# Patient Record
Sex: FEMALE | Race: WHITE | NOT HISPANIC OR LATINO | Employment: OTHER
[De-identification: names, ages, dates, MRNs, and addresses within clinical notes are randomized per-mention and may not be internally consistent; named-entity substitution may affect disease eponyms.]

---

## 2017-07-29 ENCOUNTER — HEALTH MAINTENANCE LETTER (OUTPATIENT)
Age: 52
End: 2017-07-29

## 2018-08-15 ENCOUNTER — AMBULATORY - HEALTHEAST (OUTPATIENT)
Dept: SLEEP MEDICINE | Facility: CLINIC | Age: 53
End: 2018-08-15

## 2018-08-15 DIAGNOSIS — G47.9 SLEEPING DIFFICULTY: ICD-10-CM

## 2019-03-17 ENCOUNTER — OFFICE VISIT - HEALTHEAST (OUTPATIENT)
Dept: FAMILY MEDICINE | Facility: CLINIC | Age: 54
End: 2019-03-17

## 2019-03-17 DIAGNOSIS — J40 BRONCHITIS: ICD-10-CM

## 2021-02-06 ENCOUNTER — OFFICE VISIT - HEALTHEAST (OUTPATIENT)
Dept: FAMILY MEDICINE | Facility: CLINIC | Age: 56
End: 2021-02-06

## 2021-02-06 DIAGNOSIS — L03.317 CELLULITIS OF BUTTOCK: ICD-10-CM

## 2021-02-06 DIAGNOSIS — L30.9 DERMATITIS: ICD-10-CM

## 2021-02-06 RX ORDER — LEVOTHYROXINE SODIUM 25 UG/1
25 TABLET ORAL DAILY
Status: SHIPPED | COMMUNITY
Start: 2020-12-13

## 2021-02-06 RX ORDER — VENLAFAXINE HYDROCHLORIDE 75 MG/1
CAPSULE, EXTENDED RELEASE ORAL
Status: SHIPPED | COMMUNITY
Start: 2020-12-14

## 2021-02-06 RX ORDER — TRIAMCINOLONE ACETONIDE 1 MG/G
OINTMENT TOPICAL
Qty: 30 G | Refills: 0 | Status: SHIPPED | OUTPATIENT
Start: 2021-02-06

## 2021-05-27 VITALS
DIASTOLIC BLOOD PRESSURE: 81 MMHG | HEART RATE: 80 BPM | TEMPERATURE: 98 F | SYSTOLIC BLOOD PRESSURE: 128 MMHG | OXYGEN SATURATION: 98 % | RESPIRATION RATE: 16 BRPM

## 2021-05-31 ENCOUNTER — RECORDS - HEALTHEAST (OUTPATIENT)
Dept: ADMINISTRATIVE | Facility: CLINIC | Age: 56
End: 2021-05-31

## 2021-06-02 VITALS — WEIGHT: 177.7 LBS

## 2021-06-25 NOTE — PATIENT INSTRUCTIONS - HE
There were no signs of pneumonia on exam.    Your symptoms are most likely due to acute bronchitis.  This is inflammation of the tubes leading into the lungs, most often due to a viral infection and an antibiotic will not help this.    I have prescribed an albuterol inhaler to help with the cough/wheezing.    May take Tessalon Perles as needed for cough.  May also try to use Mucinex (Guaifenasin) to help thin mucous secretions.    Please monitor symptoms carefully.  If developing chest pain, shortness of breath, fever, coughing up blood, extreme fatigue, or any other new, concerning symptoms, come back to clinic or go to ER immediately.  Otherwise, if no improvement in symptoms in 5 days, follow-up with your primary care provider.

## 2021-06-25 NOTE — PROGRESS NOTES
Chief Complaint   Patient presents with     Cough     eyes burning, coughing green mucus     Neck Pain       HPI:  Zuleyma Cedeño is a 53 y.o. female who presents today complaining of cough x 9 days.  3 days the patient felt very sick and tired.  She started to feel better but the cough has persisted.  Her cough is productive with green mucus and she feels congested in her chest.  She denies any fevers, nasal complaints, or sore throat.  She has not any abdominal complaint.  She denies any history of lung disease such as asthma or COPD.  She has been taking TheraFlu and drinking tea and honey to help with her cough.  She denies any chest pain or dyspnea upon exertion.    History obtained from the patient.    Problem List:  There are no relevant problems documented for this patient.      No past medical history on file.    Social History     Tobacco Use     Smoking status: Never Smoker     Smokeless tobacco: Never Used   Substance Use Topics     Alcohol use: Not on file       Review of Systems   Constitutional: Negative for fever.   HENT: Negative for congestion, ear pain, rhinorrhea and sore throat.    Respiratory: Positive for cough and wheezing. Negative for shortness of breath.    Cardiovascular: Negative for chest pain.   Gastrointestinal: Negative.        Vitals:    03/17/19 1148   BP: 136/79   Patient Site: Right Arm   Patient Position: Sitting   Cuff Size: Adult Regular   Pulse: 75   Resp: 17   Temp: 97.2  F (36.2  C)   TempSrc: Oral   SpO2: 100%   Weight: 177 lb 11.2 oz (80.6 kg)       Physical Exam   Constitutional: She appears well-developed and well-nourished. No distress.   HENT:   Head: Normocephalic and atraumatic.   Right Ear: External ear normal.   Left Ear: External ear normal.   Eyes: Conjunctivae are normal. Right eye exhibits no discharge. Left eye exhibits no discharge.   Cardiovascular: Normal rate, regular rhythm and normal heart sounds.   Pulmonary/Chest: Effort normal. No stridor. No  respiratory distress. She has no wheezes.   Course lung sounds and dry hacking cough.   Skin: She is not diaphoretic.   Psychiatric: She has a normal mood and affect. Her behavior is normal. Judgment and thought content normal.     Clinical Decision Making:  Lungs are clear to auscultation.  No wheezes noted currently, but patient reports them.  No signs of pneumonia and have a low suspicion for complication of a viral illness since patient has been afebrile.  Suspect viral bronchitis.  Recommend supportive cares.  Patient was started on prednisone, albuterol, Tessalon Perles.  Also recommended Mucinex to help thin mucus secretions.  Patient is agreeable to plan.  She is stable for discharge.  At the end of the encounter, I discussed results, diagnosis, medications. Discussed red flags for immediate return to clinic/ER, as well as indications for follow up if no improvement. Patient understood and agreed to plan. Patient was stable for discharge.    1. Bronchitis  predniSONE (DELTASONE) 20 MG tablet    benzonatate (TESSALON) 200 MG capsule    albuterol (PROAIR HFA;PROVENTIL HFA;VENTOLIN HFA) 90 mcg/actuation inhaler         Patient Instructions   There were no signs of pneumonia on exam.    Your symptoms are most likely due to acute bronchitis.  This is inflammation of the tubes leading into the lungs, most often due to a viral infection and an antibiotic will not help this.    I have prescribed an albuterol inhaler to help with the cough/wheezing.    May take Tessalon Perles as needed for cough.  May also try to use Mucinex (Guaifenasin) to help thin mucous secretions.    Please monitor symptoms carefully.  If developing chest pain, shortness of breath, fever, coughing up blood, extreme fatigue, or any other new, concerning symptoms, come back to clinic or go to ER immediately.  Otherwise, if no improvement in symptoms in 5 days, follow-up with your primary care provider.

## 2021-06-30 NOTE — PROGRESS NOTES
Progress Notes by Mariana Salazar MD at 2/6/2021  2:50 PM     Author: Mariana Salazar MD Service: -- Author Type: Physician    Filed: 2/6/2021  3:15 PM Encounter Date: 2/6/2021 Status: Signed    : Mariana Salazar MD (Physician)         Assessment:      Contact dermatitis with possible superimposed cellulitis.      Plan:      Aveeno baths  Benadryl prn for itching.  Follow up prn  Observe for signs of superimposed infection and systemic symptoms.  1. Cellulitis of buttock  cephalexin (KEFLEX) 500 MG capsule    triamcinolone (KENALOG) 0.1 % ointment   2. Dermatitis           Subjective:       Zuleyma Cedeño is a 55 y.o. female who presents for evaluation of rash. Rash started 3 days ago. Initial distribution: right buttock. Lesions are trey-colored and pink in color, are of raised texture, 3 by 5 cm size. Rash has not changed over time.  Rash is pruritic. Associated symptoms: none. Patient denies: fever and pain, no drainge. Patient has had previous evaluation of rash. Patient has had previous treatment.  Response to treatment: excellent. Patient has not had contacts with similar rash. Patient has not had new exposures.  The following portions of the patient's history were reviewed and updated as appropriate: allergies, current medications, past family history, past medical history, past social history, past surgical history and problem list.    Review of Systems  Pertinent items are noted in HPI.      Objective:     Physical Exam  Constitutional:       Appearance: Normal appearance.   HENT:      Head: Normocephalic and atraumatic.   Skin:            Comments: Red patchy well-circumscribed lesion on right buttocks. Some woody edema. No induration.    Neurological:      Mental Status: She is alert.

## 2022-06-14 ENCOUNTER — TRANSFERRED RECORDS (OUTPATIENT)
Dept: HEALTH INFORMATION MANAGEMENT | Facility: CLINIC | Age: 57
End: 2022-06-14

## 2022-06-14 LAB
ALT SERPL-CCNC: 17 U/L (ref 6–29)
AST SERPL-CCNC: 17 U/L (ref 10–35)
CREATININE (EXTERNAL): 0.72 MG/DL (ref 0.5–1.05)
GFR ESTIMATED (EXTERNAL): 94 ML/MIN/1.73M2
GFR ESTIMATED (IF AFRICAN AMERICAN) (EXTERNAL): 108 ML/MIN/1.73M2
GLUCOSE (EXTERNAL): 101 MG/DL (ref 65–99)
POTASSIUM (EXTERNAL): 4.3 MMOL/L (ref 3.5–5.3)
TSH SERPL-ACNC: 1.33 MIU/L (ref 0.4–4.5)

## 2022-10-04 ENCOUNTER — HOSPITAL ENCOUNTER (EMERGENCY)
Facility: HOSPITAL | Age: 57
Discharge: HOME | End: 2022-10-04
Attending: EMERGENCY MEDICINE | Admitting: EMERGENCY MEDICINE
Payer: COMMERCIAL

## 2022-10-04 VITALS
RESPIRATION RATE: 8 BRPM | SYSTOLIC BLOOD PRESSURE: 158 MMHG | OXYGEN SATURATION: 99 % | TEMPERATURE: 98.1 F | HEART RATE: 71 BPM | DIASTOLIC BLOOD PRESSURE: 92 MMHG

## 2022-10-04 DIAGNOSIS — I47.10 PAROXYSMAL SUPRAVENTRICULAR TACHYCARDIA (H): ICD-10-CM

## 2022-10-04 LAB
ANION GAP SERPL CALCULATED.3IONS-SCNC: 10 MMOL/L (ref 7–15)
BASOPHILS # BLD AUTO: 0.1 10E3/UL (ref 0–0.2)
BASOPHILS NFR BLD AUTO: 1 %
BUN SERPL-MCNC: 12.4 MG/DL (ref 6–20)
CALCIUM SERPL-MCNC: 9.9 MG/DL (ref 8.6–10)
CHLORIDE SERPL-SCNC: 102 MMOL/L (ref 98–107)
CREAT SERPL-MCNC: 0.7 MG/DL (ref 0.51–0.95)
DEPRECATED HCO3 PLAS-SCNC: 27 MMOL/L (ref 22–29)
EOSINOPHIL # BLD AUTO: 0.1 10E3/UL (ref 0–0.7)
EOSINOPHIL NFR BLD AUTO: 2 %
ERYTHROCYTE [DISTWIDTH] IN BLOOD BY AUTOMATED COUNT: 12.8 % (ref 10–15)
GFR SERPL CREATININE-BSD FRML MDRD: >90 ML/MIN/1.73M2
GLUCOSE SERPL-MCNC: 97 MG/DL (ref 70–99)
HCT VFR BLD AUTO: 44.2 % (ref 35–47)
HGB BLD-MCNC: 14.6 G/DL (ref 11.7–15.7)
IMM GRANULOCYTES # BLD: 0 10E3/UL
IMM GRANULOCYTES NFR BLD: 0 %
LYMPHOCYTES # BLD AUTO: 1.9 10E3/UL (ref 0.8–5.3)
LYMPHOCYTES NFR BLD AUTO: 28 %
MAGNESIUM SERPL-MCNC: 2.2 MG/DL (ref 1.7–2.3)
MCH RBC QN AUTO: 29.6 PG (ref 26.5–33)
MCHC RBC AUTO-ENTMCNC: 33 G/DL (ref 31.5–36.5)
MCV RBC AUTO: 90 FL (ref 78–100)
MONOCYTES # BLD AUTO: 0.3 10E3/UL (ref 0–1.3)
MONOCYTES NFR BLD AUTO: 4 %
NEUTROPHILS # BLD AUTO: 4.6 10E3/UL (ref 1.6–8.3)
NEUTROPHILS NFR BLD AUTO: 65 %
NRBC # BLD AUTO: 0 10E3/UL
NRBC BLD AUTO-RTO: 0 /100
PLATELET # BLD AUTO: 327 10E3/UL (ref 150–450)
POTASSIUM SERPL-SCNC: 3.9 MMOL/L (ref 3.4–5.3)
RBC # BLD AUTO: 4.94 10E6/UL (ref 3.8–5.2)
SODIUM SERPL-SCNC: 139 MMOL/L (ref 136–145)
TSH SERPL DL<=0.005 MIU/L-ACNC: 1.13 UIU/ML (ref 0.3–4.2)
WBC # BLD AUTO: 7 10E3/UL (ref 4–11)

## 2022-10-04 PROCEDURE — 80048 BASIC METABOLIC PNL TOTAL CA: CPT | Performed by: EMERGENCY MEDICINE

## 2022-10-04 PROCEDURE — 93005 ELECTROCARDIOGRAM TRACING: CPT | Performed by: EMERGENCY MEDICINE

## 2022-10-04 PROCEDURE — 85018 HEMOGLOBIN: CPT | Performed by: EMERGENCY MEDICINE

## 2022-10-04 PROCEDURE — 99284 EMERGENCY DEPT VISIT MOD MDM: CPT

## 2022-10-04 PROCEDURE — 83735 ASSAY OF MAGNESIUM: CPT | Performed by: EMERGENCY MEDICINE

## 2022-10-04 PROCEDURE — 84443 ASSAY THYROID STIM HORMONE: CPT | Performed by: EMERGENCY MEDICINE

## 2022-10-04 PROCEDURE — 36415 COLL VENOUS BLD VENIPUNCTURE: CPT | Performed by: EMERGENCY MEDICINE

## 2022-10-04 ASSESSMENT — ENCOUNTER SYMPTOMS
ABDOMINAL PAIN: 0
LIGHT-HEADEDNESS: 1
DYSURIA: 0
DIZZINESS: 0
NAUSEA: 0
FEVER: 0
DIAPHORESIS: 1
CONFUSION: 0
DIARRHEA: 0
FATIGUE: 1
SORE THROAT: 0
HEMATURIA: 0
SHORTNESS OF BREATH: 0
JOINT SWELLING: 0
CHILLS: 0
VOMITING: 0

## 2022-10-04 NOTE — DISCHARGE INSTRUCTIONS
Follow up with cardiology for further testing and management.  Contact your primary clinic as well to discuss all of this. If symptoms return, try previous vagal maneuvers you were taught. Return if not resolving or you develop worsening symptoms/concerns.

## 2022-10-04 NOTE — ED PROVIDER NOTES
Emergency Department Encounter     Evaluation Date & Time:   10/4/2022 11:13 AM    CHIEF COMPLAINT:  Chest Pain      Triage Note:  Patient brought in by EMS. Pt was at home cleaning her floors when she started to feel SOB and diaphoretic. Pt called 911 and EMS arrived. Gave pt adenosine which converted pt to a NSR.                   ED COURSE & MEDICAL DECISION MAKING:     ED Course as of 10/04/22 1353   Tue Oct 04, 2022   1207 Labs all unremarkable.     1239 Pt remains well on re-evaluation.  Cardiology EP referral placed.  Pt understands follow up, return precautions.       Pt presenting by EMS for evaluation of symptomatic tachycardia, found to have HR around 200 per EMS, received adenosine 12mg IV by EMS and converted to NSR in the 80s with resolution of symptoms. Pt reports this episode started around 0900 while cleaning floors. Does admit to poor diet at a weekend concert. Also reports she's had intermittent similar episodes for quite some time, previously saw cardiology and instructed on vagal maneuvers, which she tried. Denies any specific diagnosis that she's aware of, but pre-hospital EKG shows SVT.  Discussed with her and  what this is, treatment and plan for workup here. She's a little fatigued, but otherwise well here now.  No recent illness. Nothing to suggest PE/DVT. Will get labs, plan for discharge and outpatient cardiology referral as she may need EP workup/ablation as these episodes are occurring more frequently for her, but have previously resolved with vagal maneuvers, which did not work this time.     11:17 AM I met with the patient, obtained history, performed an initial exam, and discussed options and plan for diagnostics and treatment here in the ED.    12:36 PM I rechecked the patient and updated them on labs and EKG.    12:39 We discussed the plan for discharge and the patient is agreeable. Reviewed supportive cares, symptomatic treatment, outpatient follow up, and reasons to  "return to the Emergency Department. Patient to be discharged by ED RN.     At the conclusion of the encounter I discussed the results of all the tests and the disposition. The questions were answered. The patient or family acknowledged understanding and was agreeable with the care plan.      MEDICATIONS GIVEN IN THE EMERGENCY DEPARTMENT:  Medications - No data to display    NEW PRESCRIPTIONS STARTED AT TODAY'S ED VISIT:  Discharge Medication List as of 10/4/2022 12:41 PM          HPI   The history is provided by the patient. No  was used.        Zuleyma Cedeño is a 56 year old female with a pertinent history of hypothyroidism and tachycardia who presents to this ED for evaluation of chest pain.      Patient reports that this morning (10/4) around 9:45  while cleaning she started to feel like her heart was \"jumping around\" for a few minutes, and she also started to feel fatigued, lightheaded and diaphoretic.   Earlier this morning around 4:00 AM she had also felt like she couldn't breath well, but notes she had went away to a concert for the weekend and had not eaten healthy.  Pt called EMS after attempts to stop palpitatioins did not work.  EMS found her to be in SVT and gave her 12mg IV adenosine with conversion to NSR just pta to ED.  Pt now feels much better, just fatigued.    She has had the heart jumping sensation in the past, and she she would only get them twice a year. Today she had also felt anxious and wonders if it could be related to that. She has been seen by a cardiologist and they suggested surgery. She had a previous episode yesterday and she was able to get it under control through means that have worked before (vagal maneuvers). Today she wasn't able to get her heart rate under control through her known methods.     She denies any chest pain, discomfort, nausea, vomiting, or syncope.She reports a family history of ablation (her cousin), and her father had passed away from " heart related problems.   No other complaints at this time.    REVIEW OF SYSTEMS:  Review of Systems   Constitutional: Positive for diaphoresis and fatigue. Negative for chills and fever.   HENT: Negative for sore throat.    Eyes: Negative for visual disturbance.   Respiratory: Negative for shortness of breath.    Cardiovascular: Negative for chest pain (or discomfort).   Gastrointestinal: Negative for abdominal pain, diarrhea, nausea and vomiting.   Endocrine: Negative for polyuria.   Genitourinary: Negative for dysuria and hematuria.        - urinary changes     Musculoskeletal: Negative for joint swelling.   Skin: Negative for rash.   Neurological: Positive for light-headedness. Negative for dizziness and syncope.   Psychiatric/Behavioral: Negative for confusion.   All other systems reviewed and are negative.          Medical History     Past Medical History:   Diagnosis Date     Nonspecific abnormal results of thyroid function study      Papanicolaou smear of cervix with atypical squamous cells of undetermined significance (ASC-US) 05/03       No past surgical history on file.    No family history on file.    Social History     Tobacco Use     Smoking status: Never Smoker     Smokeless tobacco: Never Used       levothyroxine (SYNTHROID, LEVOTHROID) 25 MCG tablet  triamcinolone (KENALOG) 0.1 % ointment  venlafaxine (EFFEXOR-XR) 75 MG 24 hr capsule        Physical Exam     Vitals:  BP (!) 158/92   Pulse 71   Temp 98.1  F (36.7  C) (Oral)   Resp 8   SpO2 99%     PHYSICAL EXAM: **  Physical Exam  Vitals and nursing note reviewed.   Constitutional:       General: She is not in acute distress.     Appearance: Normal appearance.   HENT:      Head: Normocephalic and atraumatic.      Nose: Nose normal.      Mouth/Throat:      Mouth: Mucous membranes are moist.   Eyes:      Pupils: Pupils are equal, round, and reactive to light.   Cardiovascular:      Rate and Rhythm: Normal rate and regular rhythm.      Pulses:  Normal pulses.           Radial pulses are 2+ on the right side and 2+ on the left side.        Dorsalis pedis pulses are 2+ on the right side and 2+ on the left side.   Pulmonary:      Effort: Pulmonary effort is normal. No respiratory distress.      Breath sounds: Normal breath sounds.   Abdominal:      Palpations: Abdomen is soft.      Tenderness: There is no abdominal tenderness.   Musculoskeletal:      Cervical back: Full passive range of motion without pain and neck supple.      Comments: No calf tenderness or swelling b/l   Skin:     General: Skin is warm.      Findings: No rash.   Neurological:      General: No focal deficit present.      Mental Status: She is alert. Mental status is at baseline.      Comments: Fluent speech, no acute lateralizing deficits   Psychiatric:         Mood and Affect: Mood normal.         Behavior: Behavior normal.         Results     LAB:  All pertinent labs reviewed and interpreted  Labs Ordered and Resulted from Time of ED Arrival to Time of ED Departure   BASIC METABOLIC PANEL - Normal       Result Value    Sodium 139      Potassium 3.9      Chloride 102      Carbon Dioxide (CO2) 27      Anion Gap 10      Urea Nitrogen 12.4      Creatinine 0.70      Calcium 9.9      Glucose 97      GFR Estimate >90     MAGNESIUM - Normal    Magnesium 2.2     TSH WITH FREE T4 REFLEX - Normal    TSH 1.13     CBC WITH PLATELETS AND DIFFERENTIAL    WBC Count 7.0      RBC Count 4.94      Hemoglobin 14.6      Hematocrit 44.2      MCV 90      MCH 29.6      MCHC 33.0      RDW 12.8      Platelet Count 327      % Neutrophils 65      % Lymphocytes 28      % Monocytes 4      % Eosinophils 2      % Basophils 1      % Immature Granulocytes 0      NRBCs per 100 WBC 0      Absolute Neutrophils 4.6      Absolute Lymphocytes 1.9      Absolute Monocytes 0.3      Absolute Eosinophils 0.1      Absolute Basophils 0.1      Absolute Immature Granulocytes 0.0      Absolute NRBCs 0.0         RADIOLOGY:  No orders to  display                ECG:  NSR, rate 83, normal intervals, no acute ischemia    I have independently reviewed and interpreted the EKG(s) documented above     PROCEDURES:  Procedures:  None.      FINAL IMPRESSION:    ICD-10-CM    1. Paroxysmal supraventricular tachycardia (H)  I47.1 Adult Cardiology Eval  Referral       0 minutes of critical care time      I, Paolo Mckay , am serving as a scribe to document services personally performed by Dr. Gio Freitas, based on my observations and the provider's statements to me. I, Gio Freitas, DO attest that Paolo Mckay  is acting in a scribe capacity, has observed my performance of the services and has documented them in accordance with my direction.      Gio Freitas DO  Emergency Medicine  [facility] EMERGENCY DEPARTMENT  10/4/2022  11:26 AM        Gio Freitas MD  10/04/22 9067

## 2022-10-04 NOTE — ED TRIAGE NOTES
Patient brought in by EMS. Pt was at home cleaning her floors when she started to feel SOB and diaphoretic. Pt called 911 and EMS arrived. Gave pt adenosine which converted pt to a NSR.

## 2022-10-04 NOTE — ED TRIAGE NOTES
Triage Assessment     Row Name 10/04/22 1127       Triage Assessment (Adult)    Airway WDL WDL    Additional Documentation Satya Coma Scale (Group);Heart Sounds (Row)       Respiratory WDL    Respiratory WDL WDL       Skin Circulation/Temperature WDL    Skin Circulation/Temperature WDL WDL       Cardiac WDL    Cardiac WDL X;rhythm    Cardiac Rhythm other (see comments)  SVT prearrival        Peripheral/Neurovascular WDL    Peripheral Neurovascular WDL WDL       Cognitive/Neuro/Behavioral WDL    Cognitive/Neuro/Behavioral WDL WDL

## 2022-10-05 LAB
ATRIAL RATE - MUSE: 83 BPM
DIASTOLIC BLOOD PRESSURE - MUSE: 94 MMHG
INTERPRETATION ECG - MUSE: NORMAL
P AXIS - MUSE: 65 DEGREES
PR INTERVAL - MUSE: 162 MS
QRS DURATION - MUSE: 96 MS
QT - MUSE: 382 MS
QTC - MUSE: 448 MS
R AXIS - MUSE: 55 DEGREES
SYSTOLIC BLOOD PRESSURE - MUSE: 154 MMHG
T AXIS - MUSE: 55 DEGREES
VENTRICULAR RATE- MUSE: 83 BPM

## 2022-11-29 ENCOUNTER — OFFICE VISIT (OUTPATIENT)
Dept: CARDIOLOGY | Facility: CLINIC | Age: 57
End: 2022-11-29
Attending: EMERGENCY MEDICINE
Payer: COMMERCIAL

## 2022-11-29 VITALS
BODY MASS INDEX: 31.22 KG/M2 | HEART RATE: 67 BPM | DIASTOLIC BLOOD PRESSURE: 78 MMHG | HEIGHT: 63 IN | WEIGHT: 176.2 LBS | OXYGEN SATURATION: 93 % | SYSTOLIC BLOOD PRESSURE: 130 MMHG | RESPIRATION RATE: 16 BRPM

## 2022-11-29 DIAGNOSIS — I47.10 PAROXYSMAL SUPRAVENTRICULAR TACHYCARDIA (H): Primary | ICD-10-CM

## 2022-11-29 DIAGNOSIS — E03.9 HYPOTHYROIDISM, UNSPECIFIED TYPE: ICD-10-CM

## 2022-11-29 DIAGNOSIS — F41.9 ANXIETY: ICD-10-CM

## 2022-11-29 PROCEDURE — 99204 OFFICE O/P NEW MOD 45 MIN: CPT | Performed by: INTERNAL MEDICINE

## 2022-11-29 RX ORDER — CITALOPRAM HYDROBROMIDE 20 MG/1
TABLET ORAL
COMMUNITY
Start: 2022-11-28

## 2022-11-29 RX ORDER — METOPROLOL TARTRATE 25 MG/1
25 TABLET, FILM COATED ORAL PRN
Qty: 10 TABLET | Refills: 1 | Status: SHIPPED | OUTPATIENT
Start: 2022-11-29 | End: 2024-08-13

## 2022-11-29 NOTE — PATIENT INSTRUCTIONS
Continue vagal manuevers for SVT. If that does not work, take metoprolol 25 mg to see if this would break the rhythm.  Will have you meet with one of my EP or rhythm colleagues regarding an ablation therapy.

## 2022-11-29 NOTE — PROGRESS NOTES
Thank you, Dr. Gio Freitas, for asking the [facility] team to see Ms. Zuleyma Cedeño to follow-up on SVT.    Assessment/Recommendations   Assessment:    1.  PSVT, resolved with adenosine administered by EMS in route to the ED.  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  She has been able to break these episodes in the past with vagal maneuver but did not have success on the day she presented to the ED.  At this point, discussed pill in the pocket method of treatment versus ablation therapy.  Have recommended setting her up to meet with one of the electrophysiologist to talk about ablation.  In the meantime, we will prescribe metoprolol tartrate 25 mg to take as needed for the tachycardia if a vagal maneuver does not break it.  2.  Hypothyroidism, treated with Synthroid.  TSH level within normal limits.  3.  Mild anxiety disorder      Plan:  1.  Continue current medications  2.  Patient given prescription for metoprolol tartrate 25 mg to take as needed for tachycardia  3.  Referral to EP regarding SVT ablation     History of Present Illness    Ms. Zuleyma Cedeño is a 56 year old female with history of paroxysmal tachycardia, previously controlled with vagal maneuvers who presented to the ED in September following an episode of tachycardia which did not resolve at home.  Paramedics were subsequently called and they found her to be in a narrow complex tachycardia at a rate of 200.  She was given 12 mg of IV adenosine in route to the ED with spontaneous conversion to sinus rhythm.  No recurrence of the arrhythmia was identified in the ED and the remainder of her work-up was unremarkable.  Now referred here for further evaluation.  Patient has had a history of paroxysmal tachycardia dating back 20 years now.  Had been seen by Dr. Toby Mendez in 2014 who again recommended  "vagal maneuvers but also suggested referral to EP.  At that time her episodes were infrequent and she elected to continue with vagal maneuver therapy.  Recently she reports a slight increase in frequency of her tachycardia episodes.  No clear precipitating factors.    ECG (personally reviewed): ECG in the ED demonstrates sinus rhythm    Cardiac Imaging Studies (personally reviewed): No prior cardiac imaging     Physical Examination Review of Systems   /78 (BP Location: Left arm, Patient Position: Sitting, Cuff Size: Adult Regular)   Pulse 67   Resp 16   Ht 1.6 m (5' 3\")   Wt 79.9 kg (176 lb 3.2 oz)   SpO2 93%   BMI 31.21 kg/m    Body mass index is 31.21 kg/m .  Wt Readings from Last 3 Encounters:   11/29/22 79.9 kg (176 lb 3.2 oz)   03/17/19 80.6 kg (177 lb 11.2 oz)     General Appearance:   Awake, Alert, No acute distress.   HEENT:  No scleral icterus; the mucous membranes were pink and moist.   Neck: No cervical bruits or jugular venous distention    Chest: The spine was straight. The chest was symmetric.   Lungs:   Respirations unlabored; the lungs are clear to auscultation. No wheezing   Cardiovascular:    Regular rate and rhythm.  S1, S2 normal.  No murmur or gallop   Abdomen:  No organomegaly, masses, bruits, or tenderness. Bowels sounds are present   Extremities:  No peripheral edema bilaterally   Skin: No xanthelasma. Warm, Dry.   Musculoskeletal: No tenderness.   Neurologic: Mood and affect are appropriate.    Enc Vitals  BP: 130/78  Pulse: 67  Resp: 16  SpO2: 93 %  Weight: 79.9 kg (176 lb 3.2 oz)  Height: 160 cm (5' 3\")                                         Medical History  Surgical History Family History Social History   Past Medical History:   Diagnosis Date     Nonspecific abnormal results of thyroid function study      Papanicolaou smear of cervix with atypical squamous cells of undetermined significance (ASC-US) 05/03    No past surgical history on file. No family history on file. Social " History     Socioeconomic History     Marital status:      Spouse name: Not on file     Number of children: Not on file     Years of education: Not on file     Highest education level: Not on file   Occupational History     Not on file   Tobacco Use     Smoking status: Former     Types: Cigarettes     Quit date:      Years since quittin.9     Smokeless tobacco: Never   Substance and Sexual Activity     Alcohol use: Yes     Comment: socially (vodka with soda)     Drug use: Never     Sexual activity: Not on file   Other Topics Concern     Not on file   Social History Narrative     Not on file     Social Determinants of Health     Financial Resource Strain: Not on file   Food Insecurity: Not on file   Transportation Needs: Not on file   Physical Activity: Not on file   Stress: Not on file   Social Connections: Not on file   Intimate Partner Violence: Not on file   Housing Stability: Not on file          Medications  Allergies   Current Outpatient Medications   Medication Sig Dispense Refill     citalopram (CELEXA) 20 MG tablet        levothyroxine (SYNTHROID, LEVOTHROID) 25 MCG tablet [LEVOTHYROXINE (SYNTHROID, LEVOTHROID) 25 MCG TABLET] Take 25 mcg by mouth daily.       metoprolol tartrate (LOPRESSOR) 25 MG tablet Take 1 tablet (25 mg) by mouth as needed (tachycardia) 10 tablet 1     omeprazole (PRILOSEC) 20 MG DR capsule Take 20 mg by mouth 2 times daily       triamcinolone (KENALOG) 0.1 % ointment [TRIAMCINOLONE (KENALOG) 0.1 % OINTMENT] Apply to affected region twice daily. 30 g 0     venlafaxine (EFFEXOR-XR) 75 MG 24 hr capsule [VENLAFAXINE (EFFEXOR-XR) 75 MG 24 HR CAPSULE]         No Known Allergies      Lab Results    Chemistry/lipid CBC Cardiac Enzymes/BNP/TSH/INR   Recent Labs   Lab Test 10/04/22  1123   BUN 12.4      CO2 27    Recent Labs   Lab Test 10/04/22  1123   WBC 7.0   HGB 14.6   HCT 44.2   MCV 90       Recent Labs   Lab Test 10/04/22  1123   TSH 1.13        A total of 55  minutes was spent reviewing patient's medical records, obtaining history and performing examination, as well as discussing diagnoses/ recommendations with patient and answering all questions.                       [General Appearance - Well Developed] : interactive [General Appearance - Well-Appearing] : well appearing [General Appearance - In No Acute Distress] : in no acute distress [FreeTextEntry1] : at normal baseline [Appearance Of Head] : the head was normocephalic [Sclera] : the sclera and conjunctiva were normal [PERRL With Normal Accommodation] : pupils were equal in size, round, reactive to light, with normal accommodation [Extraocular Movements] : extraocular movements were intact [Outer Ear] : the ears and nose were normal in appearance [Both Tympanic Membranes Were Examined] : both tympanic membranes were normal [Nasal Cavity] : the nasal mucosa and septum were normal [Examination Of The Oral Cavity] : the teeth, gums, and palate were normal [Oropharynx] : the oropharynx was normal  [Neck Cervical Mass (___cm)] : no neck mass was observed [Respiration, Rhythm And Depth] : normal respiratory rhythm and effort [Auscultation Breath Sounds / Voice Sounds] : clear bilateral breath sounds [Heart Rate And Rhythm] : heart rate and rhythm were normal [Heart Sounds] : normal S1 and S2 [Murmurs] : no murmurs [Generalized Lymph Node Enlargement] : no lymphadenopathy [Skin Color & Pigmentation] : normal skin color and pigmentation [] : no significant rash [Skin Lesions] : no skin lesions

## 2022-11-29 NOTE — LETTER
11/29/2022    Lv Moncada MD  [facility] [address]  [facility] [address]    RE: Zuleyma Cedeño       Dear Colleague,     I had the pleasure of seeing Zuleyma Cedeño in the [facility].      Thank you, Dr. Gio Freitas, for asking the [facility] team to see Ms. Zuleyma Cedeño to follow-up on SVT.    Assessment/Recommendations   Assessment:    1.  PSVT, resolved with adenosine administered by EMS in route to the ED.  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  She has been able to break these episodes in the past with vagal maneuver but did not have success on the day she presented to the ED.  At this point, discussed pill in the pocket method of treatment versus ablation therapy.  Have recommended setting her up to meet with one of the electrophysiologist to talk about ablation.  In the meantime, we will prescribe metoprolol tartrate 25 mg to take as needed for the tachycardia if a vagal maneuver does not break it.  2.  Hypothyroidism, treated with Synthroid.  TSH level within normal limits.  3.  Mild anxiety disorder      Plan:  1.  Continue current medications  2.  Patient given prescription for metoprolol tartrate 25 mg to take as needed for tachycardia  3.  Referral to EP regarding SVT ablation     History of Present Illness    Ms. Zuleyma Cedeño is a 56 year old female with history of paroxysmal tachycardia, previously controlled with vagal maneuvers who presented to the ED in September following an episode of tachycardia which did not resolve at home.  Paramedics were subsequently called and they found her to be in a narrow complex tachycardia at a rate of 200.  She was given 12 mg of IV adenosine in route to the ED with spontaneous conversion to sinus rhythm.  No recurrence of the arrhythmia was identified in the  "ED and the remainder of her work-up was unremarkable.  Now referred here for further evaluation.  Patient has had a history of paroxysmal tachycardia dating back 20 years now.  Had been seen by Dr. Toby Mendez in 2014 who again recommended vagal maneuvers but also suggested referral to EP.  At that time her episodes were infrequent and she elected to continue with vagal maneuver therapy.  Recently she reports a slight increase in frequency of her tachycardia episodes.  No clear precipitating factors.    ECG (personally reviewed): ECG in the ED demonstrates sinus rhythm    Cardiac Imaging Studies (personally reviewed): No prior cardiac imaging     Physical Examination Review of Systems   /78 (BP Location: Left arm, Patient Position: Sitting, Cuff Size: Adult Regular)   Pulse 67   Resp 16   Ht 1.6 m (5' 3\")   Wt 79.9 kg (176 lb 3.2 oz)   SpO2 93%   BMI 31.21 kg/m    Body mass index is 31.21 kg/m .  Wt Readings from Last 3 Encounters:   11/29/22 79.9 kg (176 lb 3.2 oz)   03/17/19 80.6 kg (177 lb 11.2 oz)     General Appearance:   Awake, Alert, No acute distress.   HEENT:  No scleral icterus; the mucous membranes were pink and moist.   Neck: No cervical bruits or jugular venous distention    Chest: The spine was straight. The chest was symmetric.   Lungs:   Respirations unlabored; the lungs are clear to auscultation. No wheezing   Cardiovascular:    Regular rate and rhythm.  S1, S2 normal.  No murmur or gallop   Abdomen:  No organomegaly, masses, bruits, or tenderness. Bowels sounds are present   Extremities:  No peripheral edema bilaterally   Skin: No xanthelasma. Warm, Dry.   Musculoskeletal: No tenderness.   Neurologic: Mood and affect are appropriate.    Enc Vitals  BP: 130/78  Pulse: 67  Resp: 16  SpO2: 93 %  Weight: 79.9 kg (176 lb 3.2 oz)  Height: 160 cm (5' 3\")                                         Medical History  Surgical History Family History Social History   Past Medical History:   Diagnosis " Date     Nonspecific abnormal results of thyroid function study      Papanicolaou smear of cervix with atypical squamous cells of undetermined significance (ASC-US)     No past surgical history on file. No family history on file. Social History     Socioeconomic History     Marital status:      Spouse name: Not on file     Number of children: Not on file     Years of education: Not on file     Highest education level: Not on file   Occupational History     Not on file   Tobacco Use     Smoking status: Former     Types: Cigarettes     Quit date:      Years since quittin.9     Smokeless tobacco: Never   Substance and Sexual Activity     Alcohol use: Yes     Comment: socially (vodka with soda)     Drug use: Never     Sexual activity: Not on file   Other Topics Concern     Not on file   Social History Narrative     Not on file     Social Determinants of Health     Financial Resource Strain: Not on file   Food Insecurity: Not on file   Transportation Needs: Not on file   Physical Activity: Not on file   Stress: Not on file   Social Connections: Not on file   Intimate Partner Violence: Not on file   Housing Stability: Not on file          Medications  Allergies   Current Outpatient Medications   Medication Sig Dispense Refill     citalopram (CELEXA) 20 MG tablet        levothyroxine (SYNTHROID, LEVOTHROID) 25 MCG tablet [LEVOTHYROXINE (SYNTHROID, LEVOTHROID) 25 MCG TABLET] Take 25 mcg by mouth daily.       metoprolol tartrate (LOPRESSOR) 25 MG tablet Take 1 tablet (25 mg) by mouth as needed (tachycardia) 10 tablet 1     omeprazole (PRILOSEC) 20 MG DR capsule Take 20 mg by mouth 2 times daily       triamcinolone (KENALOG) 0.1 % ointment [TRIAMCINOLONE (KENALOG) 0.1 % OINTMENT] Apply to affected region twice daily. 30 g 0     venlafaxine (EFFEXOR-XR) 75 MG 24 hr capsule [VENLAFAXINE (EFFEXOR-XR) 75 MG 24 HR CAPSULE]         No Known Allergies      Lab Results    Chemistry/lipid CBC Cardiac  Enzymes/BNP/TSH/INR   Recent Labs   Lab Test 10/04/22  1123   BUN 12.4      CO2 27    Recent Labs   Lab Test 10/04/22  1123   WBC 7.0   HGB 14.6   HCT 44.2   MCV 90       Recent Labs   Lab Test 10/04/22  1123   TSH 1.13        A total of 55 minutes was spent reviewing patient's medical records, obtaining history and performing examination, as well as discussing diagnoses/ recommendations with patient and answering all questions.                          Thank you for allowing me to participate in the care of your patient.      Sincerely,     Savita Kan MD     [facility]  cc:   Gio Freitas MD  [address] [place] [id number]

## 2023-01-10 ENCOUNTER — TRANSFERRED RECORDS (OUTPATIENT)
Dept: HEALTH INFORMATION MANAGEMENT | Facility: CLINIC | Age: 58
End: 2023-01-10
Payer: COMMERCIAL

## 2023-01-27 NOTE — PROGRESS NOTES
HEART CARE ENCOUNTER CONSULTATON NOTE      [facility]  [phone]      Assessment/Recommendations   Assessment/Plan:    Zuleyma Cedeño is a very pleasant 57 year old female who presents today to discuss further management options for her SVT.    Supraventricular tachycardia - symptomatic with palpitations.  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  We reviewed SVT mechanisms and reviewed treatment options including electrophysiology study and ablation vs continued medical therapy.  We reviewed the nature of EP studies and ablation for SVT, success rates depending on the specific SVT mechanism, procedural risks (including groin hematoma, tamponade, heart block, stroke) and recovery expectations.  - would like to proceed with an ablation as a long term curative therapy and try to avoid chronic medical therapy         History of Present Illness/Subjective    HPI: Zuleyma Cedeño is a very pleasant 57 year old female who presents today to discuss further management options for her SVT.    Ms Cedeño has had a history of palpitations and SVT for almost 12 years. She used to have an episode about once  A year but off late has had more frequent episodes. Most of her episodes were terminated with bearing down maneuvers. More recently however her episode of SVT did not terminate with bearing down and she called an ambulance. She was administered Adenosine 12 mg for SVT which terminated the tachycardia. No ECG strips available. She was seen by Dr Kan who referred her to EP and prescribed Metoprolol in the interim to be taken PRN. Ms Cedeño had an SVT episode recently but she did not have the Metoprolol with her  at that time but managed to terminate the episode with bearing down efforts.      Labs below reviewed personally     Physical Examination  Review of Systems   Vitals: /84 (BP Location: Left arm, Patient Position: Sitting, Cuff Size: Adult Regular)   Pulse 76   Resp 16    Wt 81.6 kg (180 lb)   BMI 31.89 kg/m    BMI= Body mass index is 31.89 kg/m .  Wt Readings from Last 3 Encounters:   23 81.6 kg (180 lb)   22 79.9 kg (176 lb 3.2 oz)   19 80.6 kg (177 lb 11.2 oz)       General Appearance:   no distress, normal body habitus   ENT/Mouth: membranes moist, no oral lesions or bleeding gums.      EYES:  no scleral icterus, normal conjunctivae   Neck: no carotid bruits or thyromegaly   Chest/Lungs:   lungs are clear to auscultation, no rales or wheezing, no sternal scar, equal chest wall expansion    Cardiovascular:   Regular. Normal first and second heart sounds with no murmurs, rubs, or gallops; the carotid, radial and posterior tibial pulses are intact, no edema bilaterally    Abdomen:  no organomegaly, masses, bruits, or tenderness; bowel sounds are present   Extremities: no cyanosis or clubbing   Skin: no xanthelasma, warm.    Neurologic: normal  bilateral, no tremors     Psychiatric: alert and oriented x3, calm        Please refer above for cardiac ROS details.        Medical History  Surgical History Family History Social History   Past Medical History:   Diagnosis Date     Nonspecific abnormal results of thyroid function study      Papanicolaou smear of cervix with atypical squamous cells of undetermined significance (ASC-US)      No past surgical history on file.  No family history on file.     Social History     Socioeconomic History     Marital status:      Spouse name: Not on file     Number of children: Not on file     Years of education: Not on file     Highest education level: Not on file   Occupational History     Not on file   Tobacco Use     Smoking status: Former     Types: Cigarettes     Quit date:      Years since quittin.0     Smokeless tobacco: Never   Substance and Sexual Activity     Alcohol use: Yes     Comment: socially (vodka with soda)     Drug use: Never     Sexual activity: Not on file   Other Topics Concern     Not  on file   Social History Narrative     Not on file     Social Determinants of Health     Financial Resource Strain: Not on file   Food Insecurity: Not on file   Transportation Needs: Not on file   Physical Activity: Not on file   Stress: Not on file   Social Connections: Not on file   Intimate Partner Violence: Not on file   Housing Stability: Not on file           Medications  Allergies   Current Outpatient Medications   Medication Sig Dispense Refill     ciclopirox (PENLAC) 8 % external solution PAINT ONTO AFFECTED NAILS NIGHTLY       citalopram (CELEXA) 20 MG tablet        levothyroxine (SYNTHROID, LEVOTHROID) 25 MCG tablet [LEVOTHYROXINE (SYNTHROID, LEVOTHROID) 25 MCG TABLET] Take 25 mcg by mouth daily.       omeprazole (PRILOSEC) 20 MG DR capsule Take 20 mg by mouth 2 times daily       metoprolol tartrate (LOPRESSOR) 25 MG tablet Take 1 tablet (25 mg) by mouth as needed (tachycardia) (Patient not taking: Reported on 2/1/2023) 10 tablet 1     triamcinolone (KENALOG) 0.1 % ointment [TRIAMCINOLONE (KENALOG) 0.1 % OINTMENT] Apply to affected region twice daily. (Patient not taking: Reported on 2/1/2023) 30 g 0     venlafaxine (EFFEXOR-XR) 75 MG 24 hr capsule [VENLAFAXINE (EFFEXOR-XR) 75 MG 24 HR CAPSULE]  (Patient not taking: Reported on 2/1/2023)       No Known Allergies       Lab Results    Chemistry/lipid CBC Cardiac Enzymes/BNP/TSH/INR   No results for input(s): CHOL, HDL, LDL, TRIG, CHOLHDLRATIO in the last 39866 hours.  No results for input(s): LDL in the last 31229 hours.  Recent Labs   Lab Test 10/04/22  1123      POTASSIUM 3.9   CHLORIDE 102   CO2 27   GLC 97   BUN 12.4   CR 0.70   GFRESTIMATED >90   VERÓNICA 9.9     Recent Labs   Lab Test 10/04/22  1123   CR 0.70     No results for input(s): A1C in the last 78767 hours.       Recent Labs   Lab Test 10/04/22  1123   WBC 7.0   HGB 14.6   HCT 44.2   MCV 90        Recent Labs   Lab Test 10/04/22  1123   HGB 14.6    No results for input(s): TROPONINI  in the last 60039 hours.  No results for input(s): BNP, NTBNPI, NTBNP in the last 01631 hours.  Recent Labs   Lab Test 10/04/22  1123   TSH 1.13     No results for input(s): INR in the last 80245 hours.     Shara Egan MD

## 2023-02-01 ENCOUNTER — DOCUMENTATION ONLY (OUTPATIENT)
Dept: CARDIOLOGY | Facility: CLINIC | Age: 58
End: 2023-02-01

## 2023-02-01 ENCOUNTER — OFFICE VISIT (OUTPATIENT)
Dept: CARDIOLOGY | Facility: CLINIC | Age: 58
End: 2023-02-01
Payer: COMMERCIAL

## 2023-02-01 VITALS
DIASTOLIC BLOOD PRESSURE: 84 MMHG | BODY MASS INDEX: 31.89 KG/M2 | WEIGHT: 180 LBS | SYSTOLIC BLOOD PRESSURE: 133 MMHG | RESPIRATION RATE: 16 BRPM | HEART RATE: 76 BPM

## 2023-02-01 DIAGNOSIS — I47.10 PAROXYSMAL SUPRAVENTRICULAR TACHYCARDIA (H): Primary | ICD-10-CM

## 2023-02-01 DIAGNOSIS — I47.10 PAROXYSMAL SUPRAVENTRICULAR TACHYCARDIA (H): ICD-10-CM

## 2023-02-01 PROCEDURE — 99204 OFFICE O/P NEW MOD 45 MIN: CPT | Performed by: INTERNAL MEDICINE

## 2023-02-01 RX ORDER — LIDOCAINE 40 MG/G
CREAM TOPICAL
Status: CANCELLED | OUTPATIENT
Start: 2023-02-01

## 2023-02-01 RX ORDER — DEXMEDETOMIDINE HYDROCHLORIDE 4 UG/ML
.1-1.5 INJECTION, SOLUTION INTRAVENOUS CONTINUOUS
Status: CANCELLED | OUTPATIENT
Start: 2023-03-09

## 2023-02-01 RX ORDER — CICLOPIROX 80 MG/ML
SOLUTION TOPICAL
COMMUNITY
Start: 2023-01-25

## 2023-02-01 RX ORDER — FENTANYL CITRATE 50 UG/ML
25 INJECTION, SOLUTION INTRAMUSCULAR; INTRAVENOUS
Status: CANCELLED | OUTPATIENT
Start: 2023-03-09

## 2023-02-01 RX ORDER — SODIUM CHLORIDE 9 MG/ML
100 INJECTION, SOLUTION INTRAVENOUS CONTINUOUS
Status: CANCELLED | OUTPATIENT
Start: 2023-03-09

## 2023-02-01 NOTE — PATIENT INSTRUCTIONS
[facility]  Cardiac Electrophysiology  [address]  [place] [id number]   Office: [phone]  Fax: [phone]       Thank you for seeing us in clinic today - it is a pleasure to be a part of your care team.  Below is a summary of our plan from today's visit.      Supraventricular tachycardia - symptomatic with palpitations.  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  We reviewed SVT mechanisms and reviewed treatment options including electrophysiology study and ablation vs continued medical therapy.  We reviewed the nature of EP studies and ablation for SVT, success rates depending on the specific SVT mechanism, procedural risks (including groin hematoma, tamponade, heart block, stroke) and recovery expectations.  - would like to proceed with an ablation.    Please do not hesitate to be in touch with our office at [phone] with any questions that may arise.      Thank you for trusting us with your care,    Shara Egan MD  Clinical Cardiac Electrophysiology  [facility]  [address]  [place] [address]   Office: [phone]  Fax: [phone]

## 2023-02-01 NOTE — PROGRESS NOTES
H&P  PMD: []  Received [] Card OV: [x]  Date: ALLISON 2/1/23 Teach  []   Orders  I [x] P  [x]  Letter []   AC: None- NA AAD: Metoprolol-Hold x 3days    All other AM Meds: Take All     1965  Home:[phone] (home) Cell:[phone] (mobile)  Emergency Contact: GalaHan [phone]  PCP: Lv Moncada, [phone]    Important patient information for CSC/Cath Lab staff : None    [facility] EP Cath Lab Procedure Order   Ablation Type:Supraventricular Tachycardia  Ordering Provider: Dr Egan  Ordering Date: 2/1/2023  Diagnosis:  SVT  Anticipated Case Duration:  Standard   Scheduling Timeframe:  Next Available  Scheduling Restrictions: None  Scheduling Contact: Please contact pt to schedule, if you are unable to schedule date within the next 24 hours please contact pt to update on scheduling process  EP RN Follow Up Apt: Dr Crews or Dr Holder Ablations, do NOT need RN PC follow-up post procedure. Dr Egan's SVT, AFL, and PVC ablations need 3-4 day EP RN PC post procedure.  Current Device/Device Co Needed for Procedure: None NoneNone  Pre-Procedural Testing needed: None  Mapping System Required:  MICHELLE (Jignesh Crews)  ICE Needed:  No  Anesthesia:  Conscious Sedation- CV RN to administer    [facility] EP Cath Lab Prep   H&P:  Compled by ALLISON on 2/1/23 if scheduled within 30 days, pt to schedule with PMD if procedure outside of this timeframe  Pre-op Labs: CBC, BMP, Beta HcG if appropriate, and INR if on Warfarin will be ordered AM of procedure and Review of most recent labs, WEL for procedure  T&S Pre-Procedure Review: T&S is not required for procedure  Medical Records Pertinent for Procedure:  EKG 10/4/22  Allergies: Reviewed allergies, no concerns regarding orders for procedure    No Known Allergies    Current Outpatient Medications:      ciclopirox (PENLAC) 8 % external solution, PAINT ONTO AFFECTED NAILS NIGHTLY, Disp: , Rfl:      citalopram (CELEXA) 20 MG tablet, , Disp: , Rfl:      levothyroxine (SYNTHROID,  LEVOTHROID) 25 MCG tablet, [LEVOTHYROXINE (SYNTHROID, LEVOTHROID) 25 MCG TABLET] Take 25 mcg by mouth daily., Disp: , Rfl:      metoprolol tartrate (LOPRESSOR) 25 MG tablet, Take 1 tablet (25 mg) by mouth as needed (tachycardia) (Patient not taking: Reported on 2/1/2023), Disp: 10 tablet, Rfl: 1     omeprazole (PRILOSEC) 20 MG DR capsule, Take 20 mg by mouth 2 times daily, Disp: , Rfl:      triamcinolone (KENALOG) 0.1 % ointment, [TRIAMCINOLONE (KENALOG) 0.1 % OINTMENT] Apply to affected region twice daily. (Patient not taking: Reported on 2/1/2023), Disp: 30 g, Rfl: 0     venlafaxine (EFFEXOR-XR) 75 MG 24 hr capsule, [VENLAFAXINE (EFFEXOR-XR) 75 MG 24 HR CAPSULE]  (Patient not taking: Reported on 2/1/2023), Disp: , Rfl:     Documentation Date:2/1/2023 4:19 PM  Ni Cooper RN

## 2023-02-01 NOTE — LETTER
2/1/2023    Lv Moncada MD  [facility] [address]  [place] [facility] [place] [address]    RE: Zuleyma Cedeño       Dear Colleague,     I had the pleasure of seeing Zuleyma Cedeño in the [facility].    HEART CARE ENCOUNTER CONSULTATON NOTE      [facility]  [phone]      Assessment/Recommendations   Assessment/Plan:    Zuleyma Cedeño is a very pleasant 57 year old female who presents today to discuss further management options for her SVT.    Supraventricular tachycardia - symptomatic with palpitations.  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  We reviewed SVT mechanisms and reviewed treatment options including electrophysiology study and ablation vs continued medical therapy.  We reviewed the nature of EP studies and ablation for SVT, success rates depending on the specific SVT mechanism, procedural risks (including groin hematoma, tamponade, heart block, stroke) and recovery expectations.  - would like to proceed with an ablation as a long term curative therapy and try to avoid chronic medical therapy         History of Present Illness/Subjective    HPI: Zuleyma Cedeño is a very pleasant 57 year old female who presents today to discuss further management options for her SVT.    Ms Cedeño has had a history of palpitations and SVT for almost 12 years. She used to have an episode about once  A year but off late has had more frequent episodes. Most of her episodes were terminated with bearing down maneuvers. More recently however her episode of SVT did not terminate with bearing down and she called an ambulance. She was administered Adenosine 12 mg for SVT which terminated the tachycardia. No ECG strips available. She was seen by Dr Kan who referred her to EP and prescribed Metoprolol in the interim to be taken PRN. Ms Cedeño had an SVT episode recently but she did not have the Metoprolol with her  at that time but managed to terminate  the episode with bearing down efforts.      Labs below reviewed personally     Physical Examination  Review of Systems   Vitals: /84 (BP Location: Left arm, Patient Position: Sitting, Cuff Size: Adult Regular)   Pulse 76   Resp 16   Wt 81.6 kg (180 lb)   BMI 31.89 kg/m    BMI= Body mass index is 31.89 kg/m .  Wt Readings from Last 3 Encounters:   02/01/23 81.6 kg (180 lb)   11/29/22 79.9 kg (176 lb 3.2 oz)   03/17/19 80.6 kg (177 lb 11.2 oz)       General Appearance:   no distress, normal body habitus   ENT/Mouth: membranes moist, no oral lesions or bleeding gums.      EYES:  no scleral icterus, normal conjunctivae   Neck: no carotid bruits or thyromegaly   Chest/Lungs:   lungs are clear to auscultation, no rales or wheezing, no sternal scar, equal chest wall expansion    Cardiovascular:   Regular. Normal first and second heart sounds with no murmurs, rubs, or gallops; the carotid, radial and posterior tibial pulses are intact, no edema bilaterally    Abdomen:  no organomegaly, masses, bruits, or tenderness; bowel sounds are present   Extremities: no cyanosis or clubbing   Skin: no xanthelasma, warm.    Neurologic: normal  bilateral, no tremors     Psychiatric: alert and oriented x3, calm        Please refer above for cardiac ROS details.        Medical History  Surgical History Family History Social History   Past Medical History:   Diagnosis Date     Nonspecific abnormal results of thyroid function study      Papanicolaou smear of cervix with atypical squamous cells of undetermined significance (ASC-US) 05/03     No past surgical history on file.  No family history on file.     Social History     Socioeconomic History     Marital status:      Spouse name: Not on file     Number of children: Not on file     Years of education: Not on file     Highest education level: Not on file   Occupational History     Not on file   Tobacco Use     Smoking status: Former     Types: Cigarettes     Quit date:  2002     Years since quittin.0     Smokeless tobacco: Never   Substance and Sexual Activity     Alcohol use: Yes     Comment: socially (vodka with soda)     Drug use: Never     Sexual activity: Not on file   Other Topics Concern     Not on file   Social History Narrative     Not on file     Social Determinants of Health     Financial Resource Strain: Not on file   Food Insecurity: Not on file   Transportation Needs: Not on file   Physical Activity: Not on file   Stress: Not on file   Social Connections: Not on file   Intimate Partner Violence: Not on file   Housing Stability: Not on file           Medications  Allergies   Current Outpatient Medications   Medication Sig Dispense Refill     ciclopirox (PENLAC) 8 % external solution PAINT ONTO AFFECTED NAILS NIGHTLY       citalopram (CELEXA) 20 MG tablet        levothyroxine (SYNTHROID, LEVOTHROID) 25 MCG tablet [LEVOTHYROXINE (SYNTHROID, LEVOTHROID) 25 MCG TABLET] Take 25 mcg by mouth daily.       omeprazole (PRILOSEC) 20 MG DR capsule Take 20 mg by mouth 2 times daily       metoprolol tartrate (LOPRESSOR) 25 MG tablet Take 1 tablet (25 mg) by mouth as needed (tachycardia) (Patient not taking: Reported on 2023) 10 tablet 1     triamcinolone (KENALOG) 0.1 % ointment [TRIAMCINOLONE (KENALOG) 0.1 % OINTMENT] Apply to affected region twice daily. (Patient not taking: Reported on 2023) 30 g 0     venlafaxine (EFFEXOR-XR) 75 MG 24 hr capsule [VENLAFAXINE (EFFEXOR-XR) 75 MG 24 HR CAPSULE]  (Patient not taking: Reported on 2023)       No Known Allergies       Lab Results    Chemistry/lipid CBC Cardiac Enzymes/BNP/TSH/INR   No results for input(s): CHOL, HDL, LDL, TRIG, CHOLHDLRATIO in the last 63606 hours.  No results for input(s): LDL in the last 54190 hours.  Recent Labs   Lab Test 10/04/22  1123      POTASSIUM 3.9   CHLORIDE 102   CO2 27   GLC 97   BUN 12.4   CR 0.70   GFRESTIMATED >90   VERÓNICA 9.9     Recent Labs   Lab Test 10/04/22  1123   CR 0.70      No results for input(s): A1C in the last 18997 hours.       Recent Labs   Lab Test 10/04/22  1123   WBC 7.0   HGB 14.6   HCT 44.2   MCV 90        Recent Labs   Lab Test 10/04/22  1123   HGB 14.6    No results for input(s): TROPONINI in the last 14807 hours.  No results for input(s): BNP, NTBNPI, NTBNP in the last 29473 hours.  Recent Labs   Lab Test 10/04/22  1123   TSH 1.13     No results for input(s): INR in the last 22431 hours.     Shara Egan MD      Thank you for allowing me to participate in the care of your patient.      Sincerely,     Shara Egan MD     [facility]  cc:   Savita Kan MD  [address]  [place] [address]

## 2023-02-02 ENCOUNTER — HOSPITAL ENCOUNTER (OUTPATIENT)
Facility: HOSPITAL | Age: 58
End: 2023-02-02
Attending: INTERNAL MEDICINE | Admitting: INTERNAL MEDICINE
Payer: COMMERCIAL

## 2023-02-02 DIAGNOSIS — I47.10 PAROXYSMAL SUPRAVENTRICULAR TACHYCARDIA (H): ICD-10-CM

## 2023-02-13 ENCOUNTER — DOCUMENTATION ONLY (OUTPATIENT)
Dept: CARDIOLOGY | Facility: CLINIC | Age: 58
End: 2023-02-13
Payer: COMMERCIAL

## 2023-03-02 ENCOUNTER — TELEPHONE (OUTPATIENT)
Dept: CARDIOLOGY | Facility: CLINIC | Age: 58
End: 2023-03-02
Payer: COMMERCIAL

## 2023-09-07 ENCOUNTER — TELEPHONE (OUTPATIENT)
Dept: CARDIOLOGY | Facility: CLINIC | Age: 58
End: 2023-09-07
Payer: COMMERCIAL

## 2023-09-07 NOTE — TELEPHONE ENCOUNTER
PC has been placed to pt > 3 times to schedule SVT ablation  Letter mailed to pt requesting CB from pt  Unable to reach pt, and no follow-up from pt regarding communication  Will await further contact from the pt, procedure will be cnx at this time until PC from pt  9/7/2023 11:11 AM  Ni Raya RN

## 2024-02-19 ENCOUNTER — TRANSFERRED RECORDS (OUTPATIENT)
Dept: HEALTH INFORMATION MANAGEMENT | Facility: CLINIC | Age: 59
End: 2024-02-19

## 2024-08-13 ENCOUNTER — TELEPHONE (OUTPATIENT)
Dept: CARDIOLOGY | Facility: CLINIC | Age: 59
End: 2024-08-13
Payer: COMMERCIAL

## 2024-08-13 DIAGNOSIS — I47.10 PAROXYSMAL SUPRAVENTRICULAR TACHYCARDIA (H): ICD-10-CM

## 2024-08-13 RX ORDER — METOPROLOL TARTRATE 25 MG/1
25 TABLET, FILM COATED ORAL PRN
Qty: 10 TABLET | Refills: 1 | Status: SHIPPED | OUTPATIENT
Start: 2024-08-13 | End: 2024-08-14

## 2024-08-13 NOTE — TELEPHONE ENCOUNTER
Please review update and address request for refill for prn Lopressor - follow-up with Dr. Jignesh ramirez on 9-30-24 - ok to provide 30 day supply until seen?  mg

## 2024-08-13 NOTE — TELEPHONE ENCOUNTER
[facility] Call Center    Phone Message    May a detailed message be left on voicemail: yes     Reason for Call: Medication Refill Request    Has the patient contacted the pharmacy for the refill? Yes   Name of medication being requested:   metoprolol tartrate (LOPRESSOR) 25 MG tablet       Provider who prescribed the medication: Loreta   Pharmacy: [facility] [address], [place] [address]  Date medication is needed: out pt is scheduled on 9/4 please refill. Thank you       Action Taken: Other: cardiology     Travel Screening: Not Applicable    Thank you!  [facility]       Date of Service:

## 2024-08-13 NOTE — TELEPHONE ENCOUNTER
Return call to patient - confirmed she was last seen by Dr. Kan in 2022 and Dr. Goldman 2023 - explained to patient that yrly follow-up is required for Rx refills so approval from Dr. Kan to refill Rx would be required.    Patient verbalized understanding and explained that she had used her last prn Lopressor 3wks ago when she had an episode of SVT that lasted about 2-3min - patient reported that she did not want to pursue ablation back in 2023 but would like to at this time.    Informed patient that she should then follow-up with Dr. Egan instead of Dr. Kan - patient agreed with plan - call transf to sched.  mg

## 2024-08-13 NOTE — TELEPHONE ENCOUNTER
Noted patient was last seen by Dr. Kan 11-29-22 - per visit note:  Plan:  1.  Continue current medications  2.  Patient given prescription for metoprolol tartrate 25 mg to take as needed for tachycardia  3.  Referral to EP regarding SVT ablation    EP consult w/ Dr. Egan 2-1-23, per visit note:  metoprolol tartrate (LOPRESSOR) 25 MG tablet 10 tablet 1 11/29/2022 -- --   Sig - Route: Take 1 tablet (25 mg) by mouth as needed (tachycardia) - Oral   Patient not taking: Reported on 2/1/2023     Noted [facility] sched patient today for follow-up with Dr. Kan on 9-4-24.  mg

## 2024-08-14 RX ORDER — METOPROLOL TARTRATE 25 MG/1
25 TABLET, FILM COATED ORAL PRN
Qty: 30 TABLET | Refills: 0 | Status: SHIPPED | OUTPATIENT
Start: 2024-08-14

## 2024-08-14 NOTE — TELEPHONE ENCOUNTER
Msg rec'd 8-13-24 @ 1723:  Savita Kan MD Gorshe, Maureen, RN  Okay to refill prescription for as needed Lopressor until she is seen back by Dr. Egan  [employer]    Rx refilled as directed.  mg

## 2024-10-19 DIAGNOSIS — I47.10 PAROXYSMAL SUPRAVENTRICULAR TACHYCARDIA (H): ICD-10-CM

## 2024-10-21 RX ORDER — METOPROLOL TARTRATE 25 MG/1
25 TABLET, FILM COATED ORAL PRN
Qty: 30 TABLET | Refills: 0 | Status: SHIPPED | OUTPATIENT
Start: 2024-10-21

## 2024-11-06 ENCOUNTER — TELEPHONE (OUTPATIENT)
Dept: CARDIOLOGY | Facility: CLINIC | Age: 59
End: 2024-11-06

## 2024-11-06 DIAGNOSIS — I47.10 PAROXYSMAL SUPRAVENTRICULAR TACHYCARDIA (H): Primary | ICD-10-CM

## 2024-11-06 NOTE — TELEPHONE ENCOUNTER
----- Message from Shara Egan sent at 11/6/2024  8:04 AM CST -----  Hi team, this patient has had at least a couple of no shows for me. I request that she not be scheduled with me the next time she would like follow up with EP. Also I will not be able to refill her meds in the future like I did last time.    Thanks,  ALLISON

## 2025-04-01 ENCOUNTER — TRANSCRIBE ORDERS (OUTPATIENT)
Dept: OTHER | Age: 60
End: 2025-04-01

## 2025-04-01 DIAGNOSIS — R06.00 DYSPNEA: ICD-10-CM

## 2025-04-01 DIAGNOSIS — I47.10 SVT (SUPRAVENTRICULAR TACHYCARDIA): Primary | ICD-10-CM

## 2025-07-07 ENCOUNTER — HOSPITAL ENCOUNTER (EMERGENCY)
Facility: HOSPITAL | Age: 60
Discharge: HOME | End: 2025-07-07
Admitting: PHYSICIAN ASSISTANT
Payer: COMMERCIAL

## 2025-07-07 VITALS
WEIGHT: 171.3 LBS | DIASTOLIC BLOOD PRESSURE: 90 MMHG | OXYGEN SATURATION: 97 % | SYSTOLIC BLOOD PRESSURE: 139 MMHG | BODY MASS INDEX: 30.34 KG/M2 | RESPIRATION RATE: 18 BRPM | TEMPERATURE: 97.7 F | HEART RATE: 72 BPM

## 2025-07-07 DIAGNOSIS — R53.83 FATIGUE: ICD-10-CM

## 2025-07-07 DIAGNOSIS — R11.0 NAUSEA: ICD-10-CM

## 2025-07-07 DIAGNOSIS — T50.905A MEDICATION REACTION, INITIAL ENCOUNTER: ICD-10-CM

## 2025-07-07 LAB
ALBUMIN SERPL BCG-MCNC: 4.6 G/DL (ref 3.5–5.2)
ALP SERPL-CCNC: 95 U/L (ref 40–150)
ALT SERPL W P-5'-P-CCNC: 19 U/L (ref 0–50)
ANION GAP SERPL CALCULATED.3IONS-SCNC: 9 MMOL/L (ref 7–15)
AST SERPL W P-5'-P-CCNC: 19 U/L (ref 0–45)
BASOPHILS # BLD AUTO: 0.1 10E3/UL (ref 0–0.2)
BASOPHILS NFR BLD AUTO: 1 %
BILIRUB DIRECT SERPL-MCNC: 0.12 MG/DL (ref 0–0.3)
BILIRUB SERPL-MCNC: 0.5 MG/DL
BUN SERPL-MCNC: 11.3 MG/DL (ref 8–23)
CALCIUM SERPL-MCNC: 10.2 MG/DL (ref 8.8–10.4)
CHLORIDE SERPL-SCNC: 104 MMOL/L (ref 98–107)
CREAT SERPL-MCNC: 0.69 MG/DL (ref 0.51–0.95)
EGFRCR SERPLBLD CKD-EPI 2021: >90 ML/MIN/1.73M2
EOSINOPHIL # BLD AUTO: 0 10E3/UL (ref 0–0.7)
EOSINOPHIL NFR BLD AUTO: 0 %
ERYTHROCYTE [DISTWIDTH] IN BLOOD BY AUTOMATED COUNT: 12.5 % (ref 10–15)
GLUCOSE SERPL-MCNC: 99 MG/DL (ref 70–99)
HCO3 SERPL-SCNC: 27 MMOL/L (ref 22–29)
HCT VFR BLD AUTO: 44.8 % (ref 35–47)
HGB BLD-MCNC: 15.2 G/DL (ref 11.7–15.7)
HOLD SPECIMEN: NORMAL
HOLD SPECIMEN: NORMAL
IMM GRANULOCYTES # BLD: 0 10E3/UL
IMM GRANULOCYTES NFR BLD: 0 %
LIPASE SERPL-CCNC: 62 U/L (ref 13–60)
LYMPHOCYTES # BLD AUTO: 2.6 10E3/UL (ref 0.8–5.3)
LYMPHOCYTES NFR BLD AUTO: 28 %
MAGNESIUM SERPL-MCNC: 2.5 MG/DL (ref 1.7–2.3)
MCH RBC QN AUTO: 29.4 PG (ref 26.5–33)
MCHC RBC AUTO-ENTMCNC: 33.9 G/DL (ref 31.5–36.5)
MCV RBC AUTO: 87 FL (ref 78–100)
MONOCYTES # BLD AUTO: 0.4 10E3/UL (ref 0–1.3)
MONOCYTES NFR BLD AUTO: 4 %
NEUTROPHILS # BLD AUTO: 6.2 10E3/UL (ref 1.6–8.3)
NEUTROPHILS NFR BLD AUTO: 67 %
NRBC # BLD AUTO: 0 10E3/UL
NRBC BLD AUTO-RTO: 0 /100
PLATELET # BLD AUTO: 412 10E3/UL (ref 150–450)
POTASSIUM SERPL-SCNC: 4.4 MMOL/L (ref 3.4–5.3)
PROT SERPL-MCNC: 8.1 G/DL (ref 6.4–8.3)
RBC # BLD AUTO: 5.17 10E6/UL (ref 3.8–5.2)
SODIUM SERPL-SCNC: 140 MMOL/L (ref 135–145)
TSH SERPL DL<=0.005 MIU/L-ACNC: 0.55 UIU/ML (ref 0.3–4.2)
WBC # BLD AUTO: 9.2 10E3/UL (ref 4–11)

## 2025-07-07 PROCEDURE — 83735 ASSAY OF MAGNESIUM: CPT | Performed by: PHYSICIAN ASSISTANT

## 2025-07-07 PROCEDURE — 82040 ASSAY OF SERUM ALBUMIN: CPT | Performed by: STUDENT IN AN ORGANIZED HEALTH CARE EDUCATION/TRAINING PROGRAM

## 2025-07-07 PROCEDURE — 85025 COMPLETE CBC W/AUTO DIFF WBC: CPT | Performed by: STUDENT IN AN ORGANIZED HEALTH CARE EDUCATION/TRAINING PROGRAM

## 2025-07-07 PROCEDURE — 83690 ASSAY OF LIPASE: CPT | Performed by: PHYSICIAN ASSISTANT

## 2025-07-07 PROCEDURE — 80048 BASIC METABOLIC PNL TOTAL CA: CPT | Performed by: STUDENT IN AN ORGANIZED HEALTH CARE EDUCATION/TRAINING PROGRAM

## 2025-07-07 PROCEDURE — 36415 COLL VENOUS BLD VENIPUNCTURE: CPT | Performed by: STUDENT IN AN ORGANIZED HEALTH CARE EDUCATION/TRAINING PROGRAM

## 2025-07-07 PROCEDURE — 84443 ASSAY THYROID STIM HORMONE: CPT | Performed by: PHYSICIAN ASSISTANT

## 2025-07-07 PROCEDURE — 99283 EMERGENCY DEPT VISIT LOW MDM: CPT

## 2025-07-07 RX ORDER — ONDANSETRON 4 MG/1
4 TABLET, ORALLY DISINTEGRATING ORAL EVERY 8 HOURS PRN
Qty: 15 TABLET | Refills: 0 | Status: SHIPPED | OUTPATIENT
Start: 2025-07-07 | End: 2025-07-12

## 2025-07-07 ASSESSMENT — COLUMBIA-SUICIDE SEVERITY RATING SCALE - C-SSRS
2. HAVE YOU ACTUALLY HAD ANY THOUGHTS OF KILLING YOURSELF IN THE PAST MONTH?: NO
1. IN THE PAST MONTH, HAVE YOU WISHED YOU WERE DEAD OR WISHED YOU COULD GO TO SLEEP AND NOT WAKE UP?: NO
6. HAVE YOU EVER DONE ANYTHING, STARTED TO DO ANYTHING, OR PREPARED TO DO ANYTHING TO END YOUR LIFE?: NO

## 2025-07-07 NOTE — ED TRIAGE NOTES
Pt to ED with complaint of general weakness, intermittent abd cramping, and nausea. Pt reports she had some samples for the medication and has taken 3 shots. She reports she was instructed to take the shot every week. Two weeks ago the pt states she started noticing an increase in general weakness and fatigue. She states that in the last couple days she has noticed some abd cramping. She reports last BM was 2 days ago and at that time was hard and small. She reports she took 2 laxatives and prune juice. Pt reports lack of appetite and nausea with these symptoms       Triage Assessment (Adult)       Row Name 07/07/25 1204          Triage Assessment    Airway WDL WDL        Respiratory WDL    Respiratory WDL WDL        Skin Circulation/Temperature WDL    Skin Circulation/Temperature WDL WDL        Cardiac WDL    Cardiac WDL WDL        Peripheral/Neurovascular WDL    Peripheral Neurovascular WDL WDL        Cognitive/Neuro/Behavioral WDL    Cognitive/Neuro/Behavioral WDL WDL

## 2025-07-07 NOTE — ED PROVIDER NOTES
EMERGENCY DEPARTMENT ENCOUNTER      NAME: Zuleyma Cedeño  AGE: 59 year old female  YOB: 1965  MRN: [id number]  EVALUATION DATE & TIME: No admission date for patient encounter.    PCP: Lv Moncada    ED PROVIDER: Tobias Street PA-C      Chief Complaint   Patient presents with    Fatigue    Abdominal Pain    Nausea         FINAL IMPRESSION:  1. Fatigue    2. Medication reaction, initial encounter    3. Nausea          ED COURSE & MEDICAL DECISION MAKING:    Pertinent Labs & Imaging studies reviewed. (See chart for details)  2:54 PM I met the patient and performed my initial interview and exam.   3:23 PM Labs reviewed, patient to be discharged.     59 year old female presents to the Emergency Department for evaluation of fatigue, abdominal cramping.     ED Course as of 07/07/25 1524   Mon Jul 07, 2025   1521 Patient is a 59-year-old female, past medical history of hypothyroidism, tachycardia, who presents emergency department for evaluation of generalized illness, weakness, intermittent abdominal cramping, nausea.  Patient reports that she has had ongoing symptoms for roughly 2 weeks, feeling very fatigued.  She reports that she tried Ozempic, last dose was approximately 2 weeks ago.  She has had ongoing fatigue and generalized weakness since then.  She does note intermittent abdominal cramping.  On arrival here, she is not febrile tachycardic tachypneic.  Not hypoxic or hypertensive.  Patient does not have significant abdominal pain rebound or guarding.  No palpitations, chest pain or shortness of breath.  Lung sounds and heart sounds here are normal.  Laboratory studies were conducted prior to my initial evaluation and do not show significant evidence of anemia, leukocytosis, electrolyte abnormalities, thyroid dysfunction, or elevations significant in lipase.  Patient does not have any abdominal pain currently, low concern for cholecystitis, cholelithiasis, pancreatitis.  It is possible  that this is medication reaction, she additionally sounds like she has some constipation ongoing, has recently started taking laxatives which could contribute to abdominal cramping.  Overall, laboratory studies here do not show significant contributory cause for patient's symptoms and she otherwise is well-appearing, alert and oriented and ambulatory.  Certainly consistent with central neurologic etiology, I do not think there is indication for additional labs or imaging here.  Recommend hydration and outpatient follow-up with primary care.  She is agreeable with this plan       Medical Decision Making  I reviewed the EMR: Outpatient Record: Patient office visit on 04/01/2025  Discharge. I prescribed additional prescription strength medication(s) as charted. N/A.    MIPS (CTPE, Dental pain, Kumar, Sinusitis, Asthma/COPD, Head Trauma): Not Applicable    SEPSIS: None    At the conclusion of the encounter I discussed the results of all of the tests and the disposition. The questions were answered. The patient or family acknowledged understanding and was agreeable with the care plan.     0 minutes of critical care time     MEDICATIONS GIVEN IN THE EMERGENCY:  Medications - No data to display    NEW PRESCRIPTIONS STARTED AT TODAY'S ER VISIT  New Prescriptions    ONDANSETRON (ZOFRAN ODT) 4 MG ODT TAB    Take 1 tablet (4 mg) by mouth every 8 hours as needed.        =================================================================    HPI    Patient information was obtained from: Patient     Use of : N/A        Zuleyma Mendozaanna is a 59 year old female with a pertinent history of with prednisone, tachycardia who presents to this ED for evaluation of Fatigue, cramping abdominal pain.  Patient reports symptoms been ongoing for a week or so now, she feels very fatigued.  She did recently use Ozempic approximately 2 weeks ago, suspects this is what initially triggered her symptoms.  She has not had any vomiting,  intermittent nausea, intermittent abdominal cramping, no fevers.  No other known ill symptoms.  No chest pain or shortness of breath.    PAST MEDICAL HISTORY:  Past Medical History:   Diagnosis Date    Nonspecific abnormal results of thyroid function study     Papanicolaou smear of cervix with atypical squamous cells of undetermined significance (ASC-US)        PAST SURGICAL HISTORY:  No past surgical history on file.    CURRENT MEDICATIONS:    ciclopirox (PENLAC) 8 % external solution  citalopram (CELEXA) 20 MG tablet  levothyroxine (SYNTHROID, LEVOTHROID) 25 MCG tablet  metoprolol tartrate (LOPRESSOR) 25 MG tablet  omeprazole (PRILOSEC) 20 MG DR capsule  ondansetron (ZOFRAN ODT) 4 MG ODT tab  triamcinolone (KENALOG) 0.1 % ointment  venlafaxine (EFFEXOR-XR) 75 MG 24 hr capsule         ALLERGIES:  No Known Allergies    FAMILY HISTORY:  No family history on file.    SOCIAL HISTORY:   Social History     Socioeconomic History    Marital status:    Tobacco Use    Smoking status: Former     Current packs/day: 0.00     Types: Cigarettes     Quit date:      Years since quittin.    Smokeless tobacco: Never   Substance and Sexual Activity    Alcohol use: Yes     Comment: socially (vodka with soda)    Drug use: Never       VITALS:  BP (!) 163/104   Pulse 80   Temp 97.7  F (36.5  C) (Temporal)   Resp 16   Wt 77.7 kg (171 lb 4.8 oz)   SpO2 98%   BMI 30.34 kg/m      PHYSICAL EXAM    Physical Exam  Vitals and nursing note reviewed.   Constitutional:       General: She is not in acute distress.     Appearance: Normal appearance. She is normal weight. She is not toxic-appearing or diaphoretic.   HENT:      Head: Normocephalic.      Right Ear: External ear normal.      Left Ear: External ear normal.   Cardiovascular:      Rate and Rhythm: Normal rate and regular rhythm.      Heart sounds: Normal heart sounds. No murmur heard.     No friction rub. No gallop.   Pulmonary:      Effort: Pulmonary effort is  normal. No respiratory distress.      Breath sounds: No wheezing.   Abdominal:      General: Abdomen is flat. Bowel sounds are normal. There is no distension.      Palpations: Abdomen is soft. There is no mass.      Tenderness: There is no abdominal tenderness. There is no right CVA tenderness, left CVA tenderness, guarding or rebound.      Hernia: No hernia is present.   Skin:     General: Skin is warm.      Findings: No erythema or rash.   Neurological:      General: No focal deficit present.      Mental Status: She is alert. Mental status is at baseline.      Motor: No weakness.           LAB:  All pertinent labs reviewed and interpreted.  Labs Ordered and Resulted from Time of ED Arrival to Time of ED Departure   LIPASE - Abnormal       Result Value    Lipase 62 (*)    MAGNESIUM - Abnormal    Magnesium 2.5 (*)    BASIC METABOLIC PANEL - Normal    Sodium 140      Potassium 4.4      Chloride 104      Carbon Dioxide (CO2) 27      Anion Gap 9      Urea Nitrogen 11.3      Creatinine 0.69      GFR Estimate >90      Calcium 10.2      Glucose 99     HEPATIC FUNCTION PANEL - Normal    Protein Total 8.1      Albumin 4.6      Bilirubin Total 0.5      Alkaline Phosphatase 95      AST 19      ALT 19      Bilirubin Direct 0.12     TSH WITH FREE T4 REFLEX - Normal    TSH 0.55     CBC WITH PLATELETS AND DIFFERENTIAL    WBC Count 9.2      RBC Count 5.17      Hemoglobin 15.2      Hematocrit 44.8      MCV 87      MCH 29.4      MCHC 33.9      RDW 12.5      Platelet Count 412      % Neutrophils 67      % Lymphocytes 28      % Monocytes 4      % Eosinophils 0      % Basophils 1      % Immature Granulocytes 0      NRBCs per 100 WBC 0      Absolute Neutrophils 6.2      Absolute Lymphocytes 2.6      Absolute Monocytes 0.4      Absolute Eosinophils 0.0      Absolute Basophils 0.1      Absolute Immature Granulocytes 0.0      Absolute NRBCs 0.0         RADIOLOGY:  Reviewed all pertinent imaging. Please see official radiology report.  No  orders to display     PROCEDURES:   None.     Tobias Street PA-C  Emergency Medicine  [facility] EMERGENCY DEPARTMENT  [facility] [facility] [id number] [phone]  Dept: [phone]       Tobias Street PA-C  07/07/25 1525

## 2025-07-07 NOTE — DISCHARGE INSTRUCTIONS
You were seen here in the emergency department for evaluation of fatigue, possible medication reaction.  As we discussed, your laboratory studies here are reassuring, your thyroid function tests are within normal limits, your electrolytes here are fairly reassuring, I do not appreciate signs of kidney dysfunction, electrolyte abnormality, or liver dysfunction.  Your pancreas enzymes here are at the borderline of normal, however not significantly elevated or indicative of acute pancreatitis.    If you develop significant worsening in your symptoms, abdominal pain, fevers, or other acute symptoms, I would recommend return to the emergency department for reassessment.  Otherwise, stay hydrated, eat a really bland diet over the next couple of days, lots of fluids and follow-up with your primary care doctor.    For pain or fever you may use:  -Tylenol 650 mg every 6 hours.  Max 4000 mg in 24 hours  Do not use thismedication with alcohol as it can cause liver problems.  -Ibuprofen 600 mg every 6 hours.  Max 3500 mg in 24 hours  Do not take this medication if you have a history of a GI bleed or have kidney problems.  You may use both of these medications at the same time or you can alternate them every 3 hours.  For example, Tylenol at 6 AM, ibuprofen at 9 AM, Tylenol at noon, etc.